# Patient Record
Sex: MALE | Race: WHITE | HISPANIC OR LATINO | Employment: UNEMPLOYED | URBAN - METROPOLITAN AREA
[De-identification: names, ages, dates, MRNs, and addresses within clinical notes are randomized per-mention and may not be internally consistent; named-entity substitution may affect disease eponyms.]

---

## 2020-02-21 ENCOUNTER — APPOINTMENT (OUTPATIENT)
Dept: RADIOLOGY | Facility: MEDICAL CENTER | Age: 30
DRG: 091 | End: 2020-02-21
Attending: EMERGENCY MEDICINE

## 2020-02-21 ENCOUNTER — HOSPITAL ENCOUNTER (EMERGENCY)
Facility: MEDICAL CENTER | Age: 30
End: 2020-02-21
Attending: EMERGENCY MEDICINE

## 2020-02-21 ENCOUNTER — HOSPITAL ENCOUNTER (INPATIENT)
Facility: MEDICAL CENTER | Age: 30
LOS: 1 days | DRG: 091 | End: 2020-02-22
Attending: EMERGENCY MEDICINE | Admitting: INTERNAL MEDICINE

## 2020-02-21 VITALS
WEIGHT: 150 LBS | HEIGHT: 66 IN | BODY MASS INDEX: 24.11 KG/M2 | HEART RATE: 93 BPM | RESPIRATION RATE: 15 BRPM | OXYGEN SATURATION: 97 % | SYSTOLIC BLOOD PRESSURE: 98 MMHG | TEMPERATURE: 98 F | DIASTOLIC BLOOD PRESSURE: 56 MMHG

## 2020-02-21 DIAGNOSIS — R45.851 SUICIDAL IDEATION: ICD-10-CM

## 2020-02-21 DIAGNOSIS — Z86.2 HISTORY OF HEMOPHILIA: ICD-10-CM

## 2020-02-21 DIAGNOSIS — S00.81XA ABRASION OF FACE, INITIAL ENCOUNTER: ICD-10-CM

## 2020-02-21 DIAGNOSIS — S06.6X0A SUBARACHNOID HEMORRHAGE FOLLOWING INJURY, NO LOSS OF CONSCIOUSNESS, INITIAL ENCOUNTER (HCC): ICD-10-CM

## 2020-02-21 DIAGNOSIS — F10.920 ALCOHOLIC INTOXICATION WITHOUT COMPLICATION (HCC): ICD-10-CM

## 2020-02-21 DIAGNOSIS — Z00.8 MEDICAL CLEARANCE FOR INCARCERATION: ICD-10-CM

## 2020-02-21 LAB
ABO GROUP BLD: NORMAL
ALBUMIN SERPL BCP-MCNC: 5 G/DL (ref 3.2–4.9)
ALBUMIN/GLOB SERPL: 1.6 G/DL
ALP SERPL-CCNC: 85 U/L (ref 30–99)
ALT SERPL-CCNC: 22 U/L (ref 2–50)
ANION GAP SERPL CALC-SCNC: 13 MMOL/L (ref 0–11.9)
APPEARANCE UR: CLEAR
APTT PPP: 46.9 SEC (ref 24.7–36)
AST SERPL-CCNC: 23 U/L (ref 12–45)
BACTERIA #/AREA URNS HPF: NEGATIVE /HPF
BILIRUB SERPL-MCNC: 0.4 MG/DL (ref 0.1–1.5)
BILIRUB UR QL STRIP.AUTO: NEGATIVE
BLD GP AB SCN SERPL QL: NORMAL
BUN SERPL-MCNC: 9 MG/DL (ref 8–22)
CALCIUM SERPL-MCNC: 9.1 MG/DL (ref 8.5–10.5)
CHLORIDE SERPL-SCNC: 108 MMOL/L (ref 96–112)
CO2 SERPL-SCNC: 23 MMOL/L (ref 20–33)
COLOR UR: YELLOW
CREAT SERPL-MCNC: 0.82 MG/DL (ref 0.5–1.4)
EPI CELLS #/AREA URNS HPF: NEGATIVE /HPF
ERYTHROCYTE [DISTWIDTH] IN BLOOD BY AUTOMATED COUNT: 40.4 FL (ref 35.9–50)
ETHANOL BLD-MCNC: 0.26 G/DL
GLOBULIN SER CALC-MCNC: 3.1 G/DL (ref 1.9–3.5)
GLUCOSE SERPL-MCNC: 107 MG/DL (ref 65–99)
GLUCOSE UR STRIP.AUTO-MCNC: NEGATIVE MG/DL
HBV CORE AB SERPL QL IA: NEGATIVE
HBV SURFACE AB SERPL IA-ACNC: 57.94 MIU/ML (ref 0–10)
HBV SURFACE AG SER QL: NEGATIVE
HBV SURFACE AG SER QL: NEGATIVE
HCT VFR BLD AUTO: 48.1 % (ref 42–52)
HCV AB SER QL: NEGATIVE
HCV AB SER QL: NEGATIVE
HGB BLD-MCNC: 16.3 G/DL (ref 14–18)
HIV 1+2 AB+HIV1 P24 AG SERPL QL IA: NON REACTIVE
HYALINE CASTS #/AREA URNS LPF: ABNORMAL /LPF
INR PPP: 1.02 (ref 0.87–1.13)
KETONES UR STRIP.AUTO-MCNC: ABNORMAL MG/DL
LEUKOCYTE ESTERASE UR QL STRIP.AUTO: NEGATIVE
MCH RBC QN AUTO: 30.4 PG (ref 27–33)
MCHC RBC AUTO-ENTMCNC: 33.9 G/DL (ref 33.7–35.3)
MCV RBC AUTO: 89.7 FL (ref 81.4–97.8)
MICRO URNS: ABNORMAL
NITRITE UR QL STRIP.AUTO: NEGATIVE
PH UR STRIP.AUTO: 6 [PH] (ref 5–8)
PLATELET # BLD AUTO: 337 K/UL (ref 164–446)
PMV BLD AUTO: 9.1 FL (ref 9–12.9)
POTASSIUM SERPL-SCNC: 3.9 MMOL/L (ref 3.6–5.5)
PROT SERPL-MCNC: 8.1 G/DL (ref 6–8.2)
PROT UR QL STRIP: 30 MG/DL
PROTHROMBIN TIME: 13.6 SEC (ref 12–14.6)
RBC # BLD AUTO: 5.36 M/UL (ref 4.7–6.1)
RBC # URNS HPF: ABNORMAL /HPF
RBC UR QL AUTO: ABNORMAL
RH BLD: NORMAL
SODIUM SERPL-SCNC: 144 MMOL/L (ref 135–145)
SP GR UR STRIP.AUTO: 1.02
UROBILINOGEN UR STRIP.AUTO-MCNC: 0.2 MG/DL
WBC # BLD AUTO: 7.6 K/UL (ref 4.8–10.8)
WBC #/AREA URNS HPF: ABNORMAL /HPF

## 2020-02-21 PROCEDURE — 86900 BLOOD TYPING SEROLOGIC ABO: CPT

## 2020-02-21 PROCEDURE — 700111 HCHG RX REV CODE 636 W/ 250 OVERRIDE (IP)

## 2020-02-21 PROCEDURE — 80053 COMPREHEN METABOLIC PANEL: CPT

## 2020-02-21 PROCEDURE — 96372 THER/PROPH/DIAG INJ SC/IM: CPT

## 2020-02-21 PROCEDURE — 3E0234Z INTRODUCTION OF SERUM, TOXOID AND VACCINE INTO MUSCLE, PERCUTANEOUS APPROACH: ICD-10-PCS | Performed by: EMERGENCY MEDICINE

## 2020-02-21 PROCEDURE — 80307 DRUG TEST PRSMV CHEM ANLYZR: CPT

## 2020-02-21 PROCEDURE — 87389 HIV-1 AG W/HIV-1&-2 AB AG IA: CPT

## 2020-02-21 PROCEDURE — 700111 HCHG RX REV CODE 636 W/ 250 OVERRIDE (IP): Performed by: EMERGENCY MEDICINE

## 2020-02-21 PROCEDURE — 96374 THER/PROPH/DIAG INJ IV PUSH: CPT

## 2020-02-21 PROCEDURE — 99283 EMERGENCY DEPT VISIT LOW MDM: CPT

## 2020-02-21 PROCEDURE — 700111 HCHG RX REV CODE 636 W/ 250 OVERRIDE (IP): Performed by: NEUROLOGICAL SURGERY

## 2020-02-21 PROCEDURE — 99291 CRITICAL CARE FIRST HOUR: CPT

## 2020-02-21 PROCEDURE — 86706 HEP B SURFACE ANTIBODY: CPT

## 2020-02-21 PROCEDURE — 86803 HEPATITIS C AB TEST: CPT

## 2020-02-21 PROCEDURE — 70450 CT HEAD/BRAIN W/O DYE: CPT

## 2020-02-21 PROCEDURE — 86901 BLOOD TYPING SEROLOGIC RH(D): CPT

## 2020-02-21 PROCEDURE — 86850 RBC ANTIBODY SCREEN: CPT

## 2020-02-21 PROCEDURE — 90471 IMMUNIZATION ADMIN: CPT

## 2020-02-21 PROCEDURE — 85027 COMPLETE CBC AUTOMATED: CPT

## 2020-02-21 PROCEDURE — 86704 HEP B CORE ANTIBODY TOTAL: CPT

## 2020-02-21 PROCEDURE — 700105 HCHG RX REV CODE 258: Performed by: NEUROLOGICAL SURGERY

## 2020-02-21 PROCEDURE — 85610 PROTHROMBIN TIME: CPT

## 2020-02-21 PROCEDURE — 90715 TDAP VACCINE 7 YRS/> IM: CPT | Performed by: EMERGENCY MEDICINE

## 2020-02-21 PROCEDURE — 81001 URINALYSIS AUTO W/SCOPE: CPT

## 2020-02-21 PROCEDURE — 87340 HEPATITIS B SURFACE AG IA: CPT

## 2020-02-21 PROCEDURE — 85730 THROMBOPLASTIN TIME PARTIAL: CPT

## 2020-02-21 RX ORDER — OXYCODONE HYDROCHLORIDE 5 MG/1
2.5 TABLET ORAL
Status: DISCONTINUED | OUTPATIENT
Start: 2020-02-21 | End: 2020-02-22 | Stop reason: HOSPADM

## 2020-02-21 RX ORDER — LORAZEPAM 2 MG/ML
2 INJECTION INTRAMUSCULAR ONCE
Status: COMPLETED | OUTPATIENT
Start: 2020-02-21 | End: 2020-02-21

## 2020-02-21 RX ORDER — SCOLOPAMINE TRANSDERMAL SYSTEM 1 MG/1
1 PATCH, EXTENDED RELEASE TRANSDERMAL
Status: DISCONTINUED | OUTPATIENT
Start: 2020-02-21 | End: 2020-02-22 | Stop reason: HOSPADM

## 2020-02-21 RX ORDER — DOCUSATE SODIUM 100 MG/1
100 CAPSULE, LIQUID FILLED ORAL 2 TIMES DAILY
Status: DISCONTINUED | OUTPATIENT
Start: 2020-02-22 | End: 2020-02-22 | Stop reason: HOSPADM

## 2020-02-21 RX ORDER — BISACODYL 10 MG
10 SUPPOSITORY, RECTAL RECTAL
Status: DISCONTINUED | OUTPATIENT
Start: 2020-02-21 | End: 2020-02-22

## 2020-02-21 RX ORDER — HYDRALAZINE HYDROCHLORIDE 20 MG/ML
10 INJECTION INTRAMUSCULAR; INTRAVENOUS
Status: DISCONTINUED | OUTPATIENT
Start: 2020-02-21 | End: 2020-02-22 | Stop reason: HOSPADM

## 2020-02-21 RX ORDER — AMOXICILLIN 250 MG
1 CAPSULE ORAL NIGHTLY
Status: DISCONTINUED | OUTPATIENT
Start: 2020-02-22 | End: 2020-02-22

## 2020-02-21 RX ORDER — AMOXICILLIN 250 MG
1 CAPSULE ORAL
Status: DISCONTINUED | OUTPATIENT
Start: 2020-02-21 | End: 2020-02-22

## 2020-02-21 RX ORDER — POLYETHYLENE GLYCOL 3350 17 G/17G
1 POWDER, FOR SOLUTION ORAL 2 TIMES DAILY PRN
Status: DISCONTINUED | OUTPATIENT
Start: 2020-02-21 | End: 2020-02-22

## 2020-02-21 RX ORDER — LABETALOL HYDROCHLORIDE 5 MG/ML
10 INJECTION, SOLUTION INTRAVENOUS
Status: DISCONTINUED | OUTPATIENT
Start: 2020-02-21 | End: 2020-02-22 | Stop reason: HOSPADM

## 2020-02-21 RX ORDER — ONDANSETRON 2 MG/ML
4 INJECTION INTRAMUSCULAR; INTRAVENOUS EVERY 4 HOURS PRN
Status: DISCONTINUED | OUTPATIENT
Start: 2020-02-21 | End: 2020-02-22

## 2020-02-21 RX ORDER — ACETAMINOPHEN 500 MG
1000 TABLET ORAL EVERY 6 HOURS
Status: DISCONTINUED | OUTPATIENT
Start: 2020-02-22 | End: 2020-02-22 | Stop reason: HOSPADM

## 2020-02-21 RX ORDER — HALOPERIDOL 5 MG/ML
5 INJECTION INTRAMUSCULAR ONCE
Status: COMPLETED | OUTPATIENT
Start: 2020-02-21 | End: 2020-02-21

## 2020-02-21 RX ORDER — OXYCODONE HYDROCHLORIDE 5 MG/1
5 TABLET ORAL
Status: DISCONTINUED | OUTPATIENT
Start: 2020-02-21 | End: 2020-02-22 | Stop reason: HOSPADM

## 2020-02-21 RX ORDER — LORAZEPAM 2 MG/ML
2 INJECTION INTRAMUSCULAR ONCE
Status: DISCONTINUED | OUTPATIENT
Start: 2020-02-21 | End: 2020-02-22

## 2020-02-21 RX ORDER — DEXAMETHASONE SODIUM PHOSPHATE 4 MG/ML
4 INJECTION, SOLUTION INTRA-ARTICULAR; INTRALESIONAL; INTRAMUSCULAR; INTRAVENOUS; SOFT TISSUE
Status: DISCONTINUED | OUTPATIENT
Start: 2020-02-21 | End: 2020-02-22 | Stop reason: HOSPADM

## 2020-02-21 RX ORDER — CLONIDINE HYDROCHLORIDE 0.1 MG/1
0.1 TABLET ORAL EVERY 4 HOURS PRN
Status: DISCONTINUED | OUTPATIENT
Start: 2020-02-21 | End: 2020-02-22 | Stop reason: HOSPADM

## 2020-02-21 RX ORDER — HALOPERIDOL 5 MG/1
5 TABLET ORAL ONCE
Status: DISCONTINUED | OUTPATIENT
Start: 2020-02-21 | End: 2020-02-21

## 2020-02-21 RX ORDER — ENEMA 19; 7 G/133ML; G/133ML
1 ENEMA RECTAL
Status: DISCONTINUED | OUTPATIENT
Start: 2020-02-21 | End: 2020-02-22 | Stop reason: HOSPADM

## 2020-02-21 RX ORDER — HALOPERIDOL 5 MG/ML
INJECTION INTRAMUSCULAR
Status: COMPLETED
Start: 2020-02-21 | End: 2020-02-21

## 2020-02-21 RX ADMIN — LORAZEPAM 2 MG: 2 INJECTION INTRAMUSCULAR; INTRAVENOUS at 21:05

## 2020-02-21 RX ADMIN — HALOPERIDOL LACTATE 5 MG: 5 INJECTION, SOLUTION INTRAMUSCULAR at 21:05

## 2020-02-21 RX ADMIN — HALOPERIDOL 5 MG: 5 INJECTION INTRAMUSCULAR at 21:05

## 2020-02-21 RX ADMIN — SODIUM CHLORIDE 500 MG: 9 INJECTION, SOLUTION INTRAVENOUS at 23:54

## 2020-02-21 RX ADMIN — CLOSTRIDIUM TETANI TOXOID ANTIGEN (FORMALDEHYDE INACTIVATED), CORYNEBACTERIUM DIPHTHERIAE TOXOID ANTIGEN (FORMALDEHYDE INACTIVATED), BORDETELLA PERTUSSIS TOXOID ANTIGEN (GLUTARALDEHYDE INACTIVATED), BORDETELLA PERTUSSIS FILAMENTOUS HEMAGGLUTININ ANTIGEN (FORMALDEHYDE INACTIVATED), BORDETELLA PERTUSSIS PERTACTIN ANTIGEN, AND BORDETELLA PERTUSSIS FIMBRIAE 2/3 ANTIGEN 0.5 ML: 5; 2; 2.5; 5; 3; 5 INJECTION, SUSPENSION INTRAMUSCULAR at 21:48

## 2020-02-21 ASSESSMENT — LIFESTYLE VARIABLES
TOTAL SCORE: 0
HAVE YOU EVER FELT YOU SHOULD CUT DOWN ON YOUR DRINKING: NO
EVER HAD A DRINK FIRST THING IN THE MORNING TO STEADY YOUR NERVES TO GET RID OF A HANGOVER: NO
DOES PATIENT WANT TO STOP DRINKING: NO
TOTAL SCORE: 0
EVER HAD A DRINK FIRST THING IN THE MORNING TO STEADY YOUR NERVES TO GET RID OF A HANGOVER: NO
TOTAL SCORE: 0
TOTAL SCORE: 0
CONSUMPTION TOTAL: INCOMPLETE
DOES PATIENT WANT TO STOP DRINKING: NO
DO YOU DRINK ALCOHOL: NO
EVER FELT BAD OR GUILTY ABOUT YOUR DRINKING: NO
TOTAL SCORE: 0
HAVE PEOPLE ANNOYED YOU BY CRITICIZING YOUR DRINKING: NO
EVER FELT BAD OR GUILTY ABOUT YOUR DRINKING: NO
CONSUMPTION TOTAL: INCOMPLETE
DO YOU DRINK ALCOHOL: NO
HAVE YOU EVER FELT YOU SHOULD CUT DOWN ON YOUR DRINKING: NO
HAVE PEOPLE ANNOYED YOU BY CRITICIZING YOUR DRINKING: NO
TOTAL SCORE: 0

## 2020-02-22 ENCOUNTER — APPOINTMENT (OUTPATIENT)
Dept: RADIOLOGY | Facility: MEDICAL CENTER | Age: 30
DRG: 091 | End: 2020-02-22
Attending: NEUROLOGICAL SURGERY

## 2020-02-22 ENCOUNTER — APPOINTMENT (OUTPATIENT)
Dept: RADIOLOGY | Facility: MEDICAL CENTER | Age: 30
DRG: 091 | End: 2020-02-22
Attending: INTERNAL MEDICINE

## 2020-02-22 VITALS
DIASTOLIC BLOOD PRESSURE: 52 MMHG | HEART RATE: 84 BPM | TEMPERATURE: 99.1 F | HEIGHT: 66 IN | OXYGEN SATURATION: 94 % | RESPIRATION RATE: 16 BRPM | BODY MASS INDEX: 22.32 KG/M2 | SYSTOLIC BLOOD PRESSURE: 96 MMHG | WEIGHT: 138.89 LBS

## 2020-02-22 PROBLEM — Z86.2 HISTORY OF HEMOPHILIA: Status: ACTIVE | Noted: 2020-02-22

## 2020-02-22 PROBLEM — F10.920 ACUTE ALCOHOLIC INTOXICATION WITHOUT COMPLICATION (HCC): Status: ACTIVE | Noted: 2020-02-22

## 2020-02-22 PROBLEM — I60.9 SUBARACHNOID HEMORRHAGE (HCC): Status: RESOLVED | Noted: 2020-02-22 | Resolved: 2020-02-22

## 2020-02-22 PROBLEM — R45.851 SUICIDAL IDEATION: Status: ACTIVE | Noted: 2020-02-22

## 2020-02-22 PROBLEM — I60.9 SUBARACHNOID HEMORRHAGE (HCC): Status: ACTIVE | Noted: 2020-02-22

## 2020-02-22 PROBLEM — R46.89 AGGRESSIVE BEHAVIOR: Status: ACTIVE | Noted: 2020-02-22

## 2020-02-22 PROBLEM — R45.851 SUICIDAL IDEATION: Status: RESOLVED | Noted: 2020-02-22 | Resolved: 2020-02-22

## 2020-02-22 LAB
ABO + RH BLD: NORMAL
AMPHET UR QL SCN: NEGATIVE
BARBITURATES UR QL SCN: NEGATIVE
BENZODIAZ UR QL SCN: NEGATIVE
BZE UR QL SCN: NEGATIVE
CANNABINOIDS UR QL SCN: POSITIVE
ERYTHROCYTE [DISTWIDTH] IN BLOOD BY AUTOMATED COUNT: 40.9 FL (ref 35.9–50)
FACT IX ACT/NOR PPP: <15 % (ref 75–125)
HCT VFR BLD AUTO: 43.1 % (ref 42–52)
HGB BLD-MCNC: 15 G/DL (ref 14–18)
INHIBITOR INDICATED 1863: NO
INR PPP: 1.01 (ref 0.87–1.13)
MCH RBC QN AUTO: 30.7 PG (ref 27–33)
MCHC RBC AUTO-ENTMCNC: 34.8 G/DL (ref 33.7–35.3)
MCV RBC AUTO: 88.3 FL (ref 81.4–97.8)
METHADONE UR QL SCN: NEGATIVE
OPIATES UR QL SCN: NEGATIVE
OXYCODONE UR QL SCN: NEGATIVE
PCP UR QL SCN: NEGATIVE
PLATELET # BLD AUTO: 310 K/UL (ref 164–446)
PMV BLD AUTO: 8.9 FL (ref 9–12.9)
PROPOXYPH UR QL SCN: NEGATIVE
PROTHROMBIN TIME: 13.6 SEC (ref 12–14.6)
RBC # BLD AUTO: 4.88 M/UL (ref 4.7–6.1)
WBC # BLD AUTO: 12.5 K/UL (ref 4.8–10.8)

## 2020-02-22 PROCEDURE — 70553 MRI BRAIN STEM W/O & W/DYE: CPT

## 2020-02-22 PROCEDURE — 70450 CT HEAD/BRAIN W/O DYE: CPT

## 2020-02-22 PROCEDURE — 99236 HOSP IP/OBS SAME DATE HI 85: CPT | Performed by: INTERNAL MEDICINE

## 2020-02-22 PROCEDURE — A9270 NON-COVERED ITEM OR SERVICE: HCPCS | Performed by: NEUROLOGICAL SURGERY

## 2020-02-22 PROCEDURE — 99255 IP/OBS CONSLTJ NEW/EST HI 80: CPT | Mod: GC | Performed by: PSYCHIATRY & NEUROLOGY

## 2020-02-22 PROCEDURE — 700111 HCHG RX REV CODE 636 W/ 250 OVERRIDE (IP): Performed by: EMERGENCY MEDICINE

## 2020-02-22 PROCEDURE — 770020 HCHG ROOM/CARE - TELE (206)

## 2020-02-22 PROCEDURE — 70498 CT ANGIOGRAPHY NECK: CPT

## 2020-02-22 PROCEDURE — 700102 HCHG RX REV CODE 250 W/ 637 OVERRIDE(OP): Performed by: NEUROLOGICAL SURGERY

## 2020-02-22 PROCEDURE — 70544 MR ANGIOGRAPHY HEAD W/O DYE: CPT

## 2020-02-22 PROCEDURE — 700105 HCHG RX REV CODE 258: Performed by: NEUROLOGICAL SURGERY

## 2020-02-22 PROCEDURE — A9576 INJ PROHANCE MULTIPACK: HCPCS | Performed by: NEUROLOGICAL SURGERY

## 2020-02-22 PROCEDURE — 70496 CT ANGIOGRAPHY HEAD: CPT

## 2020-02-22 PROCEDURE — 92610 EVALUATE SWALLOWING FUNCTION: CPT

## 2020-02-22 PROCEDURE — 700111 HCHG RX REV CODE 636 W/ 250 OVERRIDE (IP): Performed by: NEUROLOGICAL SURGERY

## 2020-02-22 PROCEDURE — 700105 HCHG RX REV CODE 258: Performed by: INTERNAL MEDICINE

## 2020-02-22 PROCEDURE — 85610 PROTHROMBIN TIME: CPT

## 2020-02-22 PROCEDURE — 700117 HCHG RX CONTRAST REV CODE 255: Performed by: NEUROLOGICAL SURGERY

## 2020-02-22 PROCEDURE — 85250 CLOT FACTOR IX PTC/CHRSTMAS: CPT

## 2020-02-22 PROCEDURE — 96375 TX/PRO/DX INJ NEW DRUG ADDON: CPT

## 2020-02-22 PROCEDURE — 99291 CRITICAL CARE FIRST HOUR: CPT | Performed by: INTERNAL MEDICINE

## 2020-02-22 PROCEDURE — 85027 COMPLETE CBC AUTOMATED: CPT

## 2020-02-22 RX ORDER — SODIUM CHLORIDE 9 MG/ML
INJECTION, SOLUTION INTRAVENOUS CONTINUOUS
Status: DISCONTINUED | OUTPATIENT
Start: 2020-02-22 | End: 2020-02-22 | Stop reason: HOSPADM

## 2020-02-22 RX ORDER — ACETAMINOPHEN 325 MG/1
650 TABLET ORAL EVERY 4 HOURS PRN
Status: DISCONTINUED | OUTPATIENT
Start: 2020-02-22 | End: 2020-02-22 | Stop reason: HOSPADM

## 2020-02-22 RX ORDER — IBUPROFEN 800 MG/1
800 TABLET ORAL EVERY 8 HOURS PRN
Status: ON HOLD | COMMUNITY
End: 2020-02-22

## 2020-02-22 RX ORDER — AMOXICILLIN 500 MG/1
500 CAPSULE ORAL 3 TIMES DAILY
Status: ON HOLD | COMMUNITY
End: 2020-02-22

## 2020-02-22 RX ORDER — AMOXICILLIN 250 MG
2 CAPSULE ORAL 2 TIMES DAILY
Status: DISCONTINUED | OUTPATIENT
Start: 2020-02-22 | End: 2020-02-22 | Stop reason: HOSPADM

## 2020-02-22 RX ORDER — BISACODYL 10 MG
10 SUPPOSITORY, RECTAL RECTAL
Status: DISCONTINUED | OUTPATIENT
Start: 2020-02-22 | End: 2020-02-22 | Stop reason: HOSPADM

## 2020-02-22 RX ORDER — DEXMEDETOMIDINE HYDROCHLORIDE 4 UG/ML
.1-1.5 INJECTION, SOLUTION INTRAVENOUS CONTINUOUS
Status: DISCONTINUED | OUTPATIENT
Start: 2020-02-22 | End: 2020-02-22 | Stop reason: HOSPADM

## 2020-02-22 RX ORDER — LORAZEPAM 2 MG/ML
2 INJECTION INTRAMUSCULAR
Status: DISCONTINUED | OUTPATIENT
Start: 2020-02-22 | End: 2020-02-22 | Stop reason: HOSPADM

## 2020-02-22 RX ORDER — ACETAMINOPHEN 650 MG/1
650 SUPPOSITORY RECTAL EVERY 4 HOURS PRN
Status: DISCONTINUED | OUTPATIENT
Start: 2020-02-22 | End: 2020-02-22 | Stop reason: HOSPADM

## 2020-02-22 RX ORDER — COAGULATION FACTOR IX (RECOMBINANT), FC FUSION PROTEIN 3000 UNIT
3000 KIT INTRAVENOUS SEE ADMIN INSTRUCTIONS
COMMUNITY

## 2020-02-22 RX ORDER — POLYETHYLENE GLYCOL 3350 17 G/17G
1 POWDER, FOR SOLUTION ORAL
Status: DISCONTINUED | OUTPATIENT
Start: 2020-02-22 | End: 2020-02-22 | Stop reason: HOSPADM

## 2020-02-22 RX ORDER — ONDANSETRON 2 MG/ML
4 INJECTION INTRAMUSCULAR; INTRAVENOUS EVERY 4 HOURS PRN
Status: DISCONTINUED | OUTPATIENT
Start: 2020-02-22 | End: 2020-02-22 | Stop reason: HOSPADM

## 2020-02-22 RX ORDER — HALOPERIDOL 5 MG/ML
INJECTION INTRAMUSCULAR
Status: DISCONTINUED
Start: 2020-02-22 | End: 2020-02-22 | Stop reason: HOSPADM

## 2020-02-22 RX ORDER — ONDANSETRON 4 MG/1
4 TABLET, ORALLY DISINTEGRATING ORAL EVERY 4 HOURS PRN
Status: DISCONTINUED | OUTPATIENT
Start: 2020-02-22 | End: 2020-02-22 | Stop reason: HOSPADM

## 2020-02-22 RX ADMIN — SODIUM CHLORIDE: 9 INJECTION, SOLUTION INTRAVENOUS at 00:51

## 2020-02-22 RX ADMIN — SODIUM CHLORIDE 500 MG: 9 INJECTION, SOLUTION INTRAVENOUS at 06:56

## 2020-02-22 RX ADMIN — GADOTERIDOL 13 ML: 279.3 INJECTION, SOLUTION INTRAVENOUS at 11:25

## 2020-02-22 RX ADMIN — OXYCODONE HYDROCHLORIDE 5 MG: 5 TABLET ORAL at 16:58

## 2020-02-22 RX ADMIN — COAGULATION FACTOR IX (RECOMBINANT) 5616 UNITS: KIT at 00:45

## 2020-02-22 ASSESSMENT — ENCOUNTER SYMPTOMS
NAUSEA: 0
VOMITING: 0
BLURRED VISION: 0
MUSCULOSKELETAL NEGATIVE: 1
CHILLS: 0
FEVER: 0
DIZZINESS: 0
SHORTNESS OF BREATH: 0
ABDOMINAL PAIN: 0
DOUBLE VISION: 0
FOCAL WEAKNESS: 0

## 2020-02-22 ASSESSMENT — COPD QUESTIONNAIRES
DO YOU EVER COUGH UP ANY MUCUS OR PHLEGM?: NO/ONLY WITH OCCASIONAL COLDS OR INFECTIONS
HAVE YOU SMOKED AT LEAST 100 CIGARETTES IN YOUR ENTIRE LIFE: NO/DON'T KNOW
COPD SCREENING SCORE: 0
DURING THE PAST 4 WEEKS HOW MUCH DID YOU FEEL SHORT OF BREATH: NONE/LITTLE OF THE TIME

## 2020-02-22 ASSESSMENT — LIFESTYLE VARIABLES: EVER_SMOKED: NEVER

## 2020-02-22 NOTE — ED NOTES
Patient resting in bed, sleeping, no signs of pain or distress, unlabored breathing noted, attached to portable cardiac monitor, bed in low position, call light within reach, IV normal saline infusing.

## 2020-02-22 NOTE — PROGRESS NOTES
pts friend picked up most of his belongings including ipad  Pt has his phone  Celine Ruffin  [address] [facility]  [address] [phone]
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
## 2020-02-22 NOTE — ED NOTES
Patient resting in bed, sleeping, no signs of pain or distress, unlabored breathing noted, attached to portable cardiac monitor, bed in low position, call light within reach, forehead wound noted and clean dry and intact with no active bleeding noted.

## 2020-02-22 NOTE — ED NOTES
All medications including alprolix secured in security bag [id number], confirmed with Anderson CHERRY RN, sent to pharmacy for storage.

## 2020-02-22 NOTE — ED NOTES
Patient's head of bed noted at forty five degrees, will continue to assess patient.    Patient placed on two liters of oxygen via nasal cannula.
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
## 2020-02-22 NOTE — ED NOTES
Patient resting in bed, sleeping, no signs of pain or distress, unlabored breathing noted, attached to portable cardiac monitor, bed in low position, call light within reach, frequent rounding performed, will continue to assess patient, IV fluids infusing.
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
## 2020-02-22 NOTE — ED NOTES
SBAR report given to YESSY Mcmullen, addressed all questions and concerns.    Patient transported to [id number] at this time.

## 2020-02-22 NOTE — ED NOTES
Patient resting in bed, sleeping, no signs of pain or distress, unlabored breathing noted, [employer] police at bedside, bed in low position, call light within reach, IV access established, frequent rounding performed, will continue to assess patient.
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
## 2020-02-22 NOTE — ASSESSMENT & PLAN NOTE
Factor IX deficiency, s/p Kcentra  Patient takes Alprolix every 2 weeks.  Next dose due Wednesday 2/26

## 2020-02-22 NOTE — ED NOTES
Patient returned from CT scan at this time.    Patient resting in bed, sleeping, no signs of pain or distress, unlabored breathing noted, attached to portable cardiac monitor, bed in low position, call light within reach.

## 2020-02-22 NOTE — ASSESSMENT & PLAN NOTE
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 Rodriguez and Dr. Logan  No acute intracranial hemorrhage  Outpatient follow-up for congenital anomalies discussed above is recommended.

## 2020-02-22 NOTE — CONSULTS
"PSYCHIATRIC INTAKE EVALUATION    *Reason for admission: medical clearance to go to [facility]                    *Reason for consult: SI       *Requesting Physician/APN: Raul Zamora M.D.        Supervising Psychiatrist: Carly Price MD         Legal Hold status: discontinued       *Chief Complaint: \"I am ashamed and embarrassed\"      *HPI (includes Psychiatric ROS): Pt is 29 year old male [place] from the airport after having been found to be unconscious following flight from his home in [place] to [place] for vacation. Pt states he was \"passed out\" on the airplane due to alcohol intoxication. When firefighters arrived on scene at [facility], pt reportedly became extremely hostile and \"spit\" in the face of on the treatment team. Upon arrival to [facility] pt found to have small subarachnoid hemorrhage which was likely caused [facility]    Pt states he is \"ashamed and embarrassed\" for his actions stating he does not have a hx of such behavior while intoxicated. Denies hx of depression, SI/HI/AVH/paranoia but does endorse drinking heavily over the last few months due to the fact that he is scheduled to have \"gene therapy in [place] for my hemophilia\" this Wednesday and will need to abstain from alcohol during said treatment. He does endorse some stress having lost his job recently in [place] as an  \"but nothing major\". Denies hx of significant anxiety, PTSD or panic attacks      *Medical Review Of Symptoms (not dx conditions):   Denies chest pain, SOB, urinary/stooling issues.  All other systems reviewed and are negative.       *Psychiatric Examination:   Vitals: hr 108  General Appearance: disheveled male in clean hospital attire  Abnormal Movements: none noted  Gait and Posture: wnl  Speech: normal volume/rate/rythym  Thought Process: coherent  Associations: linear  Abnormal or Psychotic Thoughts: none noted  Judgement and Insight: fair/fair  Orientation: " "aox4  Recent and Remote Memory: not formally tested but appears grossly intect  Attention Span and Concentration: fair/fair  Language: wnl  Fund of Knowledge: fair  Mood and Affect: \"ashamed\", euthymic  SI/HI: denies/denies  MMSE score and/or clock drawing: pt chose not to do       *PAST MEDICAL/PSYCH/FAMILY/SOCIAL(as reported by patient):       *medical hx:        TBI:denies  SZ:denies  Stroke: denies  No past medical history on file.  No past surgical history on file.     *psychiatric hx:   SAs: denies  Guns: owns a gun for hunting, per pt  Hx of Violence: denies  Hospitalizations: denies  Med Hx: denies  Dx Hx: denies  Other:     *family Psych hx:  unknown     *social hx: lives in [place] and visiting a friend in [place] currently. Has associates degree and recently stopped working as [employer] . Never /no children. 1 DUI last year.  Alcohol: 2 beers plus a half fifth of whiskey qday for unknown amount of time.  Drugs: 1-2 \"joints\" weekly, Denies other drug use     *MEDICAL HX: labs, MARS, medications, etc were reviewed. Only those findings of potential interest to psychiatry are noted below:    *Current Medical issues:   See below     *Allergies:  No Known Allergies  *Current Medications:    Current Facility-Administered Medications:   •  NS infusion, , Intravenous, Continuous, Raul Zamora M.D., Last Rate: 80 mL/hr at 02/22/20 0051  •  acetaminophen (TYLENOL) tablet 650 mg, 650 mg, Oral, Q4HRS PRN **OR** acetaminophen (TYLENOL) suppository 650 mg, 650 mg, Rectal, Q4HRS PRN, Raul Zamora M.D.  •  senna-docusate (PERICOLACE or SENOKOT S) 8.6-50 MG per tablet 2 Tab, 2 Tab, Oral, BID, Stopped at 02/22/20 0600 **AND** polyethylene glycol/lytes (MIRALAX) PACKET 1 Packet, 1 Packet, Oral, QDAY PRN **AND** magnesium hydroxide (MILK OF MAGNESIA) suspension 30 mL, 30 mL, Oral, QDAY PRN **AND** bisacodyl (DULCOLAX) suppository 10 mg, 10 mg, Rectal, QDAY PRN, Raul Zamora M.D.  •  MD Alert...ICU Electrolyte " Replacement per Pharmacy, , Other, PHARMACY TO DOSE, Raul Zamora M.D.  •  ondansetron (ZOFRAN ODT) dispertab 4 mg, 4 mg, Oral, Q4HRS PRN **OR** ondansetron (ZOFRAN) syringe/vial injection 4 mg, 4 mg, Intravenous, Q4HRS PRN, Raul Zamora M.D.  •  LORazepam (ATIVAN) injection 2 mg, 2 mg, Intravenous, Q5 MIN PRN, Raul Zamora M.D.  •  Respiratory Therapy Consult, , Nebulization, Continuous RT, Raul Zamora M.D.  •  iohexol (OMNIPAQUE) 350 mg/mL, 100 mL, Intravenous, Once, Clay Guerra D.O.  •  dexmedetomidine (PRECEDEX) 400 mcg/100mL NS premix infusion, 0.1-1.5 mcg/kg/hr, Intravenous, Continuous, Debora Sandoval M.D., Stopped at 02/22/20 0315  •  HALOPERIDOL LACTATE 5 MG/ML INJ SOLN, , , ,   •  Pharmacy Consult Request ...Pain Management Review 1 Each, 1 Each, Other, PHARMACY TO DOSE, Marissa Larson M.D.  •  acetaminophen (TYLENOL) tablet 1,000 mg, 1,000 mg, Oral, Q6HRS, Marissa Larson M.D., Stopped at 02/22/20 0000  •  oxyCODONE immediate-release (ROXICODONE) tablet 2.5 mg, 2.5 mg, Oral, Q3HRS PRN, Marissa Larson M.D.  •  oxyCODONE immediate-release (ROXICODONE) tablet 5 mg, 5 mg, Oral, Q3HRS PRN, Marissa Larson M.D.  •  MD ALERT...DO NOT ADMINISTER NSAIDS or ASPIRIN unless ORDERED By Neurosurgery 1 Each, 1 Each, Other, PRN, Marissa Larson M.D.  •  dexamethasone (DECADRON) injection 4 mg, 4 mg, Intravenous, Once PRN, Marissa Larson M.D.  •  scopolamine (TRANSDERM-SCOP) patch 1 Patch, 1 Patch, Transdermal, Q72HRS PRN, Marissa Larson M.D.  •  labetalol (NORMODYNE/TRANDATE) injection 10 mg, 10 mg, Intravenous, Q HOUR PRN, Marissa Larson M.D.  •  hydrALAZINE (APRESOLINE) injection 10 mg, 10 mg, Intravenous, Q HOUR PRN, Marissa Larson M.D.  •  cloNIDine (CATAPRES) tablet 0.1 mg, 0.1 mg, Oral, Q4HRS PRN, Marissa Larson M.D.  •  levETIRAcetam (KEPPRA) 500 mg in  mL IVPB, 500 mg, Intravenous, Q12HRS, Marissa Larson M.D., Stopped at 02/22/20 0711  •  docusate sodium (COLACE) capsule 100 mg, 100  "mg, Oral, BID, Marissa Larson M.D., Stopped at 02/22/20 0600  •  fleet enema 133 mL, 1 Each, Rectal, Once PRN, Marissa Larson M.D.  •  niCARdipine (CARDENE) 25 mg in  mL Infusion, 0-15 mg/hr, Intravenous, Continuous, Marissa Larson M.D., Stopped at 02/22/20 0300  *EKG: n/a  *Imaging: CT head 2/22  \"1.  Density along the medial right tentorium, concerning for small subarachnoid hemorrhage, stable since prior study.  2.  Evaluation is somewhat limited for subtle subarachnoid hemorrhage due to residual contrast enhancement from prior CT angiogram.\"   EEG: n/a       *Labs:  Recent Labs     02/21/20  1850 02/22/20  0405   WBC 7.6 12.5*   RBC 5.36 4.88   HEMOGLOBIN 16.3 15.0   HEMATOCRIT 48.1 43.1   MCV 89.7 88.3   MCH 30.4 30.7   RDW 40.4 40.9   PLATELETCT 337 310   MPV 9.1 8.9*     Lab Results   Component Value Date/Time    SODIUM 144 02/21/2020 06:50 PM    POTASSIUM 3.9 02/21/2020 06:50 PM    CHLORIDE 108 02/21/2020 06:50 PM    CO2 23 02/21/2020 06:50 PM    GLUCOSE 107 (H) 02/21/2020 06:50 PM    BUN 9 02/21/2020 06:50 PM    CREATININE 0.82 02/21/2020 06:50 PM         No results found for: BREATHALIZER  No components found for: BLOODALCOHOL   Lab Results   Component Value Date/Time    AMPHUR Negative 02/21/2020 2300    BARBSURINE Negative 02/21/2020 2300    BENZODIAZU Negative 02/21/2020 2300    COCAINEMET Negative 02/21/2020 2300    METHADONE Negative 02/21/2020 2300    OPIATES Negative 02/21/2020 2300    PCPURINE Negative 02/21/2020 2300    PROPOXY Negative 02/21/2020 2300    CANNABINOID Positive (A) 02/21/2020 2300     No results found for: TSH, FREET4      *ASSESSMENT/PLAN:  Formulation: It is unclear to pt why he became so highly combative with authorities at airport but he believes it was entirely due to alcohol intoxification. Denies drug use other than EtOH and cannabis. Pt feels his baseline mood state is not poor enough to require medication and refuses treatment for alcohol abuse stating he " intends to keep drinking until he has to stop on wednesday for aforementioned hemophilia treatment. Pt to d/c to shelter, hold to be discontinued.    1. Alcohol Use Disorder, severe  - pt refusing treatment options          2. Cannabis Use Disorder, moderate    3. Medical:  -Subarachnoid hemorrhage      Legal hold: discontinued (unable to find paperwork)    *Signing off

## 2020-02-22 NOTE — PROGRESS NOTES
Pt transported to R106 via gurney from [facility], [place] [employer] officer escort present. Pt able to nod yes/no, follow commands, PAZ. Pupils 2mm PERRRL. Somnolent secondary to rx administered in ED per ED RN and Officer. [employer] in possession of pt belongings per ED RN. Pt pants, shoes, shirt, socks and underpants removed from pt in ICU. Pt bathed; multiple abrasions and bruises noted to pt.

## 2020-02-22 NOTE — PROGRESS NOTES
Mri and MRA reviewed. 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   Nothing surgical to do.  Radiology did not think that this was avm.         Stable from our standpoint.

## 2020-02-22 NOTE — ED NOTES
Med Rec Updated.    Pt has meds at bedside. Searched through Pt's belongings with .   Pt has Tylenol #3, Ibuprofen 800mg PRN, and Amoxicillin appears to have started back in October 2019 but never finished.  PT HAS ALPROLIX 3000units IN POSSESSIONS ALTHOUGH IT IS UNKNOWN WHEN HE LAST HAD IT OR WHAT DOSING FREQUENCY IS AT THIS POINT IN TIME.    Pt unable to confirm or deny medication use at this time.

## 2020-02-22 NOTE — ED TRIAGE NOTES
"Chief Complaint   Patient presents with   • Medical Clearance     Brought in by [place] [employer], needs medical clearance fro \"clotting disorder\"     "

## 2020-02-22 NOTE — CONSULTS
"DATE OF CONSULTATION: 2/21/2020       CONSULTING PHYSICIAN: Marissa Larson M.D.     REASON FOR CONSULTATION: SDH    HISTORY OF PRESENT ILLNESS:   I took the history from the chart and if available, whatever I could get from the patient and family.   Jerry Garland is a 29 y.o. male who presents to the Emergency Department for medical clearance purposes. Per Law Enforcement the patient arrived in town on an airplane where he was unresponsive. [employer] arrived who attempted to evaluate the patient however he became combative and spit in the face of a . Law Enforcement then detained the patient who brought him here for a blood draw to rule out blood borne pathogens. At this time he is agitated and speaks in full sentences, specifically saying \"Fuck you.\"    When I saw him he been sedated with Haldol.  He was handcuffed to the bed.  He was snoring.  I could not wake him up.  Pupils were 2 mm equal and reactive.    PAST MEDICAL HISTORY:       PAST SURGICAL HISTORY: patient denies any surgical history     ALLERGIES: No Known Allergies     CURRENT MEDICATIONS:   Home Medications     Reviewed by Liam Silvestre R.N. (Registered Nurse) on 02/21/20 at 2010  Med List Status: Partial   Medication Last Dose Status        Patient Ti Taking any Medications                       FAMILY HISTORY: No family history on file.     SOCIAL HISTORY:   Social History     Tobacco Use   • Smoking status: Not on file   Substance and Sexual Activity   • Alcohol use: Not on file   • Drug use: Not on file   • Sexual activity: Not on file       REVIEW OF SYSTEMS: Comprehensive review of systems is negative with the   exception of the aforementioned HPI, PMH, and PSH elements in accordance with CMS guidelines.     PHYSICAL EXAMINATION:     VITAL SIGNS: /61   Pulse 95   Temp 36 °C (96.8 °F) (Temporal)   Resp 16   Ht 1.676 m (5' 6\")   Wt 68 kg (150 lb)   SpO2 97%     GENERAL:    He was snoring.  He has been " sedated.  Pupils were 3 mm equal and reactive.  He was handcuffed to the bed..  Prior to this he was confused and moving all 4 extremities as per the emergency room physician notes.      LABORATORY VALUES:   Recent Labs     02/21/20  1850   WBC 7.6   RBC 5.36   HEMOGLOBIN 16.3   HEMATOCRIT 48.1   MCV 89.7   MCH 30.4   MCHC 33.9   RDW 40.4   PLATELETCT 337   MPV 9.1     Recent Labs     02/21/20  1850   SODIUM 144   POTASSIUM 3.9   CHLORIDE 108   CO2 23   GLUCOSE 107*   BUN 9   CREATININE 0.82   CALCIUM 9.1            IMAGING:   CT-HEAD W/O   Final Result         1.  Density along the medial right tentorium, concerning for small subarachnoid hemorrhage.      CT-CTA HEAD WITH & W/O-POST PROCESS    (Results Pending)   CT-CTA NECK WITH & W/O-POST PROCESSING    (Results Pending)   CT-HEAD W/O    (Results Pending)       IMPRESSION AND PLAN:   There are no active hospital problems to display for this patient.    1.  Small right medial tentorial subdural/subarachnoid hemorrhage  2.  Hemophilia  3.  Altered mental status-?  Cause  4.  Apparent suicidal ideation    Plan    1.  Treat his hemophilia  2.  CT angiogram stat  3.  Admission under medicine given his altered mental status probably intensive care  4.  CT scan in the morning  5.  Keep his systolic blood pressure less than 160    If the CT angiogram is negative and the CT scan tomorrow stable, neurosurgery will sign off.    ____________________________________   Marissa Larson M.D.      DD: 2/21/2020   DT: 10:08 PM
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
## 2020-02-22 NOTE — THERAPY
"Speech Language Therapy Clinical Swallow Evaluation completed.    Functional Status: Patient was seen on this date for a clinical swallow evaluation. Pt currently NPO with no source of nutrition. Patient AAOx4, speech intact, and eager for water. Oral motor examination unremarkable. Cough was productive. PO trials were administered and consisted of ice chips, 20 oz thin liquids, 3 oz puree, 1 oz pudding, and 2 oz soft and regular textures in mixed consistency form. Onset of swallow timely and laryngeal elevation complete to palpation. No overt s/sx of aspiration appreciated across all trials tested. Vocal quality remained clear. Education provided to pt regarding current status and SLP recs.     Recommendations - Diet: At this time, recommend a Level 7 (regular), Level 0 (thin liquid) diet with monitoring. No further acute SLP services indicated to address dysphagia. Please re-consult with change in status. Per RN, MRI negative for acute bleed and will clarify order for cognitive-linguistic evaluation with MD.                             Strategies: Monitor during meals and Head of Bed at 90 Degrees                            Medication Administration: Whole with Liquid Wash    Plan of Care: SLP evaluation completed.  Patient is currently not being actively followed for therapy services at this time, however may be seen if requested by attending provider for 1 more visit within 30 days to address any discharge needs or if the patient has a change in status. RN to clarify order for cognitive-linguistic evaluation.     Post-Acute Therapy: Currently anticipate no further skilled therapy needs once patient is discharged from the inpatient setting for dysphagia.     See \"Rehab Therapy-Acute\" Patient Summary Report for complete documentation. Thank you for the consult.           "
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
## 2020-02-22 NOTE — ED NOTES
Patient returned from CT scan at this time.    Patient resting in bed, sleeping, no signs of pain or distress, unlabored breathing noted, [employer] at bedside, bed in low position, call light within reach, handcuff on right arm.

## 2020-02-22 NOTE — CONSULTS
BRIEF PSYCHIATRIC CONSULT NOTE: patient seen, full note to follow.  -Legal Hold:extended  -Sitter:yes  -Restrictions:   -phone:no   -visitors:no   -personal items: no   -medical bed:yes   -Orders Placed:once pt done retrieving phone numbers from tablet (currently doing that with police supervision) please remove belongings for time being.  -Assessment: full note to follow  -Plan:continue to follow

## 2020-02-22 NOTE — ED NOTES
Patient resting in bed, sleeping, no signs of pain or distress, unlabored breathing noted, attached to portable cardiac monitor, bed in low position, call light within reach, IV Kepra infusing.

## 2020-02-22 NOTE — ED NOTES
Patient resting in bed, sleeping, no signs of pain or distress, unlabored breathing noted, attached to portable cardiac monitor, bed in low position, call light within reach, [employer] at bedside, frequent rounding performed, will continue to assess patient.
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
## 2020-02-23 NOTE — DISCHARGE INSTRUCTIONS
Discharge Instructions    Discharged to other by car with escort. Discharged via walking, hospital escort: Refused.  Special equipment needed: Not Applicable    Be sure to schedule a follow-up appointment with your primary care doctor or any specialists as instructed.     Discharge Plan:        I understand that a diet low in cholesterol, fat, and sodium is recommended for good health. Unless I have been given specific instructions below for another diet, I accept this instruction as my diet prescription.   Other diet:     Special Instructions: None    · Is patient discharged on Warfarin / Coumadin?   No     Depression / Suicide Risk    As you are discharged from this [facility] facility, it is important to learn how to keep safe from harming yourself.    Recognize the warning signs:  · Abrupt changes in personality, positive or negative- including increase in energy   · Giving away possessions  · Change in eating patterns- significant weight changes-  positive or negative  · Change in sleeping patterns- unable to sleep or sleeping all the time   · Unwillingness or inability to communicate  · Depression  · Unusual sadness, discouragement and loneliness  · Talk of wanting to die  · Neglect of personal appearance   · Rebelliousness- reckless behavior  · Withdrawal from people/activities they love  · Confusion- inability to concentrate     If you or a loved one observes any of these behaviors or has concerns about self-harm, here's what you can do:  · Talk about it- your feelings and reasons for harming yourself  · Remove any means that you might use to hurt yourself (examples: pills, rope, extension cords, firearm)  · Get professional help from the community (Mental Health, Substance Abuse, psychological counseling)  · Do not be alone:Call your Safe Contact- someone whom you trust who will be there for you.  · Call your local CRISIS HOTLINE 472-3395 or 313-489-4709  · Call your local Children's Mobile Crisis  Response Team [facility] (830) 016-6351 or www.[employer]  · Call the toll free National Suicide Prevention Hotlines   · National Suicide Prevention Lifeline 236-119-EEMA (4381)  · National Hope Line Network 800-SUICIDE (448-3395)    Physician Discharge Instructions:  Discharge Diagnosis: Alcohol abuse, aggressive behaviour  Proceed to discharge/transfer  to Correctional facility  Take Rx/Prescriptions as prescribed and reconciled per AVS  For OTC Medication consult with your Pharmacist first.  Diet: As tolerated  Activity: As tolerated  F/u with PCP within 3-10 days at home location, ask for f/u prescriptions by PCP  For new, severe symptoms refer to the next Emergency Care or call your Physician.  Controlled Substance History was reviewed if new Rx was issued.    Pantera Dawson MD

## 2020-02-23 NOTE — THERAPY
Speech Therapy Contact Note  RN spoke with MD regarding cognitive evaluation who stated not indicated at this time. Please re-consult with any change in status or need for cognitive evaluation prior to d/c. Thank you.
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
## 2020-02-23 NOTE — PROGRESS NOTES
Pt A&Ox4 DC'd with airport . AVS reviewed, questions answered, pt meds given to officer. PIVs removed, fully intact,no bleeding noted

## 2023-01-05 NOTE — DISCHARGE PLANNING
Medical Social Work    Referral: Legal Hold/possesion     Intervention: This writer received a phone call from RN that pt wants his friend to  his belongs- pt has three bags of items and [place] . Per RN police are okay with this and the pt is alert and oriented. This writer recommended pt given portable phone only no corded telephones to assist with calling friend and to get friend's, name, address, and phone number, and leave the pt with a set of clothing for when he is discharged along with checking with charge nurse.     Plan: Pt will remain on a legal hold.     
calm